# Patient Record
Sex: FEMALE | Race: WHITE | Employment: FULL TIME | URBAN - METROPOLITAN AREA
[De-identification: names, ages, dates, MRNs, and addresses within clinical notes are randomized per-mention and may not be internally consistent; named-entity substitution may affect disease eponyms.]

---

## 2023-01-30 ENCOUNTER — APPOINTMENT (OUTPATIENT)
Dept: GENERAL RADIOLOGY | Age: 53
End: 2023-01-30
Payer: COMMERCIAL

## 2023-01-30 ENCOUNTER — HOSPITAL ENCOUNTER (EMERGENCY)
Age: 53
Discharge: HOME | End: 2023-01-30
Payer: COMMERCIAL

## 2023-01-30 VITALS
SYSTOLIC BLOOD PRESSURE: 141 MMHG | HEART RATE: 80 BPM | OXYGEN SATURATION: 98 % | BODY MASS INDEX: 31.53 KG/M2 | WEIGHT: 167 LBS | HEIGHT: 61 IN | DIASTOLIC BLOOD PRESSURE: 116 MMHG | RESPIRATION RATE: 16 BRPM | TEMPERATURE: 98.2 F

## 2023-01-30 DIAGNOSIS — R00.2 PALPITATIONS: Primary | ICD-10-CM

## 2023-01-30 LAB
A/G RATIO: 1.1 (ref 1.1–2.2)
ALBUMIN SERPL-MCNC: 4.1 G/DL (ref 3.4–5)
ALP BLD-CCNC: 78 U/L (ref 40–129)
ALT SERPL-CCNC: 49 U/L (ref 10–40)
ANION GAP SERPL CALCULATED.3IONS-SCNC: 12 MMOL/L (ref 3–16)
AST SERPL-CCNC: 33 U/L (ref 15–37)
BASOPHILS ABSOLUTE: 0.1 K/UL (ref 0–0.2)
BASOPHILS RELATIVE PERCENT: 1.3 %
BILIRUB SERPL-MCNC: 0.4 MG/DL (ref 0–1)
BUN BLDV-MCNC: 16 MG/DL (ref 7–20)
CALCIUM SERPL-MCNC: 9.3 MG/DL (ref 8.3–10.6)
CHLORIDE BLD-SCNC: 107 MMOL/L (ref 99–110)
CO2: 25 MMOL/L (ref 21–32)
CREAT SERPL-MCNC: 0.6 MG/DL (ref 0.6–1.1)
D DIMER: <0.27 UG/ML FEU (ref 0–0.6)
EKG ATRIAL RATE: 71 BPM
EKG DIAGNOSIS: NORMAL
EKG P AXIS: 42 DEGREES
EKG P-R INTERVAL: 148 MS
EKG Q-T INTERVAL: 408 MS
EKG QRS DURATION: 82 MS
EKG QTC CALCULATION (BAZETT): 443 MS
EKG R AXIS: 70 DEGREES
EKG T AXIS: 14 DEGREES
EKG VENTRICULAR RATE: 71 BPM
EOSINOPHILS ABSOLUTE: 0.1 K/UL (ref 0–0.6)
EOSINOPHILS RELATIVE PERCENT: 1.7 %
GFR SERPL CREATININE-BSD FRML MDRD: >60 ML/MIN/{1.73_M2}
GLUCOSE BLD-MCNC: 111 MG/DL (ref 70–99)
HCT VFR BLD CALC: 39.4 % (ref 36–48)
HEMOGLOBIN: 13.2 G/DL (ref 12–16)
LYMPHOCYTES ABSOLUTE: 2.1 K/UL (ref 1–5.1)
LYMPHOCYTES RELATIVE PERCENT: 32.1 %
MAGNESIUM: 2.1 MG/DL (ref 1.8–2.4)
MCH RBC QN AUTO: 30.1 PG (ref 26–34)
MCHC RBC AUTO-ENTMCNC: 33.4 G/DL (ref 31–36)
MCV RBC AUTO: 90.2 FL (ref 80–100)
MONOCYTES ABSOLUTE: 0.5 K/UL (ref 0–1.3)
MONOCYTES RELATIVE PERCENT: 7.4 %
NEUTROPHILS ABSOLUTE: 3.7 K/UL (ref 1.7–7.7)
NEUTROPHILS RELATIVE PERCENT: 57.5 %
PDW BLD-RTO: 13.7 % (ref 12.4–15.4)
PLATELET # BLD: 459 K/UL (ref 135–450)
PMV BLD AUTO: 8.7 FL (ref 5–10.5)
POTASSIUM SERPL-SCNC: 4.2 MMOL/L (ref 3.5–5.1)
PRO-BNP: 60 PG/ML (ref 0–124)
RBC # BLD: 4.37 M/UL (ref 4–5.2)
SODIUM BLD-SCNC: 144 MMOL/L (ref 136–145)
TOTAL PROTEIN: 7.7 G/DL (ref 6.4–8.2)
TROPONIN: <0.01 NG/ML
TROPONIN: <0.01 NG/ML
TSH REFLEX: 2.26 UIU/ML (ref 0.27–4.2)
WBC # BLD: 6.5 K/UL (ref 4–11)

## 2023-01-30 PROCEDURE — 85379 FIBRIN DEGRADATION QUANT: CPT

## 2023-01-30 PROCEDURE — 93010 ELECTROCARDIOGRAM REPORT: CPT | Performed by: INTERNAL MEDICINE

## 2023-01-30 PROCEDURE — 99285 EMERGENCY DEPT VISIT HI MDM: CPT

## 2023-01-30 PROCEDURE — 85025 COMPLETE CBC W/AUTO DIFF WBC: CPT

## 2023-01-30 PROCEDURE — 80053 COMPREHEN METABOLIC PANEL: CPT

## 2023-01-30 PROCEDURE — 83880 ASSAY OF NATRIURETIC PEPTIDE: CPT

## 2023-01-30 PROCEDURE — 84443 ASSAY THYROID STIM HORMONE: CPT

## 2023-01-30 PROCEDURE — 71045 X-RAY EXAM CHEST 1 VIEW: CPT

## 2023-01-30 PROCEDURE — 84484 ASSAY OF TROPONIN QUANT: CPT

## 2023-01-30 PROCEDURE — 93005 ELECTROCARDIOGRAM TRACING: CPT | Performed by: EMERGENCY MEDICINE

## 2023-01-30 PROCEDURE — 83735 ASSAY OF MAGNESIUM: CPT

## 2023-01-30 PROCEDURE — 93005 ELECTROCARDIOGRAM TRACING: CPT | Performed by: PHYSICIAN ASSISTANT

## 2023-01-30 ASSESSMENT — ENCOUNTER SYMPTOMS
DIARRHEA: 0
BACK PAIN: 0
PHOTOPHOBIA: 0
SHORTNESS OF BREATH: 0
NAUSEA: 1
COUGH: 0
COLOR CHANGE: 0
CONSTIPATION: 0
RESPIRATORY NEGATIVE: 1
ABDOMINAL PAIN: 0
VOMITING: 0
CHEST TIGHTNESS: 0

## 2023-01-30 ASSESSMENT — PAIN - FUNCTIONAL ASSESSMENT: PAIN_FUNCTIONAL_ASSESSMENT: NONE - DENIES PAIN

## 2023-01-30 ASSESSMENT — HEART SCORE: ECG: 1

## 2023-01-30 NOTE — ED PROVIDER NOTES
[facility]        Pt Name: Matt Barrios  MRN: [id number]  [place] 1970  Date of evaluation: 1/30/2023  Provider: JD Vaca  PCP: Zayda Webster  Note Started: 4:12 PM EST 1/30/23      MANAS. I have evaluated this patient. My supervising physician was available for consultation. CHIEF COMPLAINT       Chief Complaint   Patient presents with    Palpitations     Came by [facility] from work with c/o palpitations with dizziness and nauseous that has been going on for at least a month. States feels like heart skips a beat. Happens randomly. No cardiac hx. Denies SOB. HISTORY OF PRESENT ILLNESS: 1 or more Elements     History From: Patient  Limitations to history : None    Matt Barrios is a 45 y.o. female with no significant past medical history who presents to the ED with complaint of palpitations. She has had intermittent palpitations for the past couple of months. States last few seconds and subsides. States she feels like she is skipping a beat. States has been ongoing for the past couple months but has not sought medical attention until today. States last night while watching the [employer] game she states she had back-to-back episodes about 20 minutes apart. Patient states they have never been this frequent. She had no other episode today so she came to the ED at the request of PCP for further evaluation and treatment. She states when the episodes happen she feels lightheaded like she is going to pass out but denies any actual syncopal episodes. She denies any dizziness or room spinning sensation. She has had some episodes of nausea but denies any vomiting. Denies abdominal pain, chest tightness, chest pain or shortness of breath. Denies pleuritic pain, orthopnea, pedal edema or calf tenderness. Denies visual changes, headache, speech services or numbness/tingling. Denies significant caffeine usage. States she does drink 1 spark every morning. Denies history of thyroid problems. Denies significant drug or alcohol usage. Denies fever or chills. Denies rashes or lesions. States she is not a smoker. Denies history of hyperlipidemia or diabetes. States does have history of hypertension. States does have family history of heart disease. Nursing Notes were all reviewed and agreed with or any disagreements were addressed in the HPI. REVIEW OF SYSTEMS :      Review of Systems   Constitutional:  Negative for activity change, appetite change, chills, diaphoresis, fatigue and fever. Eyes:  Negative for photophobia and visual disturbance. Respiratory: Negative. Negative for cough, chest tightness and shortness of breath. Cardiovascular:  Positive for palpitations. Negative for chest pain and leg swelling. Gastrointestinal:  Positive for nausea. Negative for abdominal pain, constipation, diarrhea and vomiting. Genitourinary:  Negative for decreased urine volume, difficulty urinating, dysuria, flank pain, frequency, genital sores, hematuria, menstrual problem, pelvic pain, urgency, vaginal bleeding, vaginal discharge and vaginal pain. Musculoskeletal:  Negative for arthralgias, back pain, myalgias, neck pain and neck stiffness. Skin:  Negative for color change, pallor, rash and wound. Neurological:  Positive for light-headedness. Negative for dizziness, tremors, seizures, syncope, speech difficulty, weakness, numbness and headaches. Positives and Pertinent negatives as per HPI. SURGICAL HISTORY   History reviewed. No pertinent surgical history. CURRENTMEDICATIONS     There are no discharge medications for this patient. ALLERGIES     Patient has no known allergies. FAMILYHISTORY     History reviewed. No pertinent family history.      SOCIAL HISTORY       Social History     Tobacco Use    Smoking status: Never    Smokeless tobacco: Never   Substance Use Topics    Alcohol use: Yes     Comment: occasionally    Drug use: Never       SCREENINGS        [place] Coma Scale  Eye Opening: Spontaneous  Best Verbal Response: Oriented  Best Motor Response: Obeys commands  [place] Coma Scale Score: 15        Heart Score for chest pain patients  History: Slightly Suspicious  ECG: Non-Specifc repolarization disturbance/LBTB/PM  Patient Age: > 39 and < 65 years  *Risk factors for Atherosclerotic disease: Positive family History, Obesity  Risk Factors: 1 or 2 risk factors  Troponin: < 1X normal limit  Heart Score Total: 3       CIWA Assessment  BP: (!) 141/116  Heart Rate: 80           PHYSICAL EXAM  1 or more Elements     ED Triage Vitals [01/30/23 1002]   BP Temp Temp Source Heart Rate Resp SpO2 Height Weight   (!) 156/82 98.2 °F (36.8 °C) Oral 70 18 99 % 5' 1\" (1.549 m) 167 lb (75.8 kg)       Physical Exam  Constitutional:       General: She is not in acute distress. Appearance: Normal appearance. She is well-developed. She is not ill-appearing, toxic-appearing or diaphoretic. HENT:      Head: Normocephalic and atraumatic. Right Ear: External ear normal.      Left Ear: External ear normal.   Eyes:      General:         Right eye: No discharge. Left eye: No discharge. Conjunctiva/sclera: Conjunctivae normal.   Cardiovascular:      Rate and Rhythm: Normal rate and regular rhythm. Pulses: Normal pulses. Heart sounds: Normal heart sounds. No murmur heard. No friction rub. No gallop. Comments: 2+ radial pulses bilaterally. No pedal edema. No calf tenderness. No JVD  Pulmonary:      Effort: Pulmonary effort is normal. No respiratory distress. Breath sounds: Normal breath sounds. No stridor. No wheezing, rhonchi or rales. Chest:      Chest wall: No tenderness. Abdominal:      General: Abdomen is flat. There is no distension. Palpations: Abdomen is soft. There is no mass. Tenderness: There is no abdominal tenderness.  There is no right CVA tenderness, left CVA tenderness, guarding or rebound. Hernia: No hernia is present. Musculoskeletal:         General: Normal range of motion. Cervical back: Normal range of motion and neck supple. No rigidity or tenderness. Lymphadenopathy:      Cervical: No cervical adenopathy. Skin:     General: Skin is warm and dry. Coloration: Skin is not pale. Findings: No erythema or rash. Neurological:      Mental Status: She is alert and oriented to person, place, and time. Psychiatric:         Behavior: Behavior normal.           DIAGNOSTIC RESULTS   LABS:    Labs Reviewed   CBC WITH AUTO DIFFERENTIAL - Abnormal; Notable for the following components:       Result Value    Platelets 771 (*)     All other components within normal limits   COMPREHENSIVE METABOLIC PANEL - Abnormal; Notable for the following components:    Glucose 111 (*)     ALT 49 (*)     All other components within normal limits   TROPONIN   BRAIN NATRIURETIC PEPTIDE   D-DIMER, QUANTITATIVE   MAGNESIUM   TROPONIN   TSH WITH REFLEX       When ordered only abnormal lab results are displayed. All other labs were within normal range or not returned as of this dictation. EKG: When ordered, EKG's are interpreted by the Emergency Department Physician in the absence of a cardiologist.  Please see their note for interpretation of EKG. RADIOLOGY:   Non-plain film images such as CT, Ultrasound and MRI are read by the radiologist. Plain radiographic images are visualized and preliminarily interpreted by the ED Provider with the below findings:        Interpretation per the Radiologist below, if available at the time of this note:    XR CHEST PORTABLE   Final Result   No acute cardiopulmonary findings           XR CHEST PORTABLE    Result Date: 1/30/2023  EXAMINATION: [address] [place] 349 1/30/2023 10:22 am COMPARISON: None.  HISTORY: ORDERING SYSTEM PROVIDED HISTORY: palps TECHNOLOGIST PROVIDED HISTORY: Reason for exam:->palps Reason for Exam: Palpitations (Came by [facility] from work with c/o palpitations with dizziness and nauseous that has been going on for at least a month. States feels like heart skips a beat. Happens randomly. No cardiac hx. Denies SOB. ) FINDINGS: Normal cardiomediastinal silhouette. No pneumothorax or pleural effusion. No acute airspace infiltrate     No acute cardiopulmonary findings       No results found. PROCEDURES   Unless otherwise noted below, none     Procedures    CRITICAL CARE TIME (.cctime)       PAST MEDICAL HISTORY      has no past medical history on file. EMERGENCY DEPARTMENT COURSE and DIFFERENTIAL DIAGNOSIS/MDM:   Vitals:    Vitals:    01/30/23 1400 01/30/23 1430 01/30/23 1500 01/30/23 1530   BP: 136/72 139/74 139/81 (!) 141/116   Pulse: 73 73 71 80   Resp: 12 12 12 16   Temp:       TempSrc:       SpO2: 97% 97% 96% 98%   Weight:       Height:           Patient was given the following medications:  Medications - No data to display          Is this patient to be included in the SEP-1 Core Measure due to severe sepsis or septic shock? No   Exclusion criteria - the patient is NOT to be included for SEP-1 Core Measure due to: Infection is not suspected    Chronic Conditions affecting care:    has no past medical history on file. CONSULTS: (Who and What was discussed)  None      Social Determinants : None    Records Reviewed (Source): None    CC/HPI Summary, DDx, ED Course, and Reassessment: Patient is a 49-year-old female who presents to the ED with complaint of palpitations. She has had intermittent palpitations in the past couple months. Last episode earlier this morning. Lasted a few seconds and is since subsided. Asymptomatic throughout entire stay here in the emergency department. Obtained and interpreted by attending. Was hooked up to telemetry monitor here in the emergency department no further episodes of irregular rhythm noted.   Troponin was normal.  CBC showed normal white count, hemoglobin and platelets of 691. BMP unremarkable. D-dimer was normal.  TSH is pending. CMP relatively unremarkable. Magnesium normal.  Chest x-ray unremarkable. Patient is remained asymptomatic throughout entire stay here in the emergency department. Has low heart score. Believe can be safely discharged home with close outpatient follow-up with cardiology. Low sufficient for ACS, PE, dissection, AAA, pneumonia, pneumothorax, respiratory stress, GERD, anxiety, CHF, COPD, asthma, surgical abdomen, electrolyte abnormality, cardiac arrhythmia or other emergent etiology at this time. Delta troponin EKG obtained for further restratification. Delta EKG interpreted by attending. Delta troponin was normal.  Given patient's work-up here in the ED believe can be safely discharged home with close outpatient follow-up. Refer to cardiology and PCP. Return to ED for new or worsening symptoms. I am the Primary Clinician of Record. FINAL IMPRESSION      1. Palpitations          DISPOSITION/PLAN     DISPOSITION Decision To Discharge 01/30/2023 02:58:41 PM      PATIENT REFERRED TO:  [facility]  [address] [facility] [address]  [phone]    Schedule an appointment as soon as possible for a visit   For a Re-check in   2-3   days. Travis Mehta MD  [address] [place] [address]  [phone]    Schedule an appointment as soon as possible for a visit   For a Re-check in  3-5    days. DISCHARGE MEDICATIONS:  There are no discharge medications for this patient. DISCONTINUED MEDICATIONS:  There are no discharge medications for this patient.              (Please note that portions of this note were completed with a voice recognition program.  Efforts were made to edit the dictations but occasionally words are mis-transcribed.)    JD Archer (electronically signed)       JD Santillan  01/30/23 2635

## 2023-01-30 NOTE — ED PROVIDER NOTES
Patient was seen and evaluated independently by MANAS. I was immediately available for consultation if needed. This note is provided for EKG interpretation only.     EKG:    EKG was reviewed by emergency department physician in the absence of a cardiologist    Narrow complex sinus rhythm, rate 71, normal axis, normal PA and QRS intervals, normal Qtc, no ST elevations or depressions, TWI V2 and V3, impression NSR with nonspecific T wave morphology, no STEMI, no comparison available      Repeat EKG at 1407 without any significant change in morphology     [address] [facility], DO  01/30/23 [place] [id number], DO  01/30/23 683

## 2023-01-31 LAB
EKG ATRIAL RATE: 76 BPM
EKG DIAGNOSIS: NORMAL
EKG P AXIS: 46 DEGREES
EKG P-R INTERVAL: 156 MS
EKG Q-T INTERVAL: 404 MS
EKG QRS DURATION: 82 MS
EKG QTC CALCULATION (BAZETT): 454 MS
EKG R AXIS: 78 DEGREES
EKG T AXIS: 0 DEGREES
EKG VENTRICULAR RATE: 76 BPM

## 2023-01-31 PROCEDURE — 93010 ELECTROCARDIOGRAM REPORT: CPT | Performed by: INTERNAL MEDICINE

## 2023-02-01 ENCOUNTER — TELEPHONE (OUTPATIENT)
Dept: CARDIOLOGY CLINIC | Age: 53
End: 2023-02-01

## 2023-02-01 NOTE — TELEPHONE ENCOUNTER
Received a referral in [facility] for ED follow up referral.     Called and spoke with patient, she stated she is trying to get in with her PCP first before she schedules with Cardiology. I mentioned the [facility] office has availability if that would be closer to her since she lives in [place] and she said that when she calls back to schedule the [place] office would be perfect. If patient calls back this week PAULA has openings on Wednesday at [facility]. Please schedule patient when she calls back.